# Patient Record
Sex: MALE | Race: WHITE | NOT HISPANIC OR LATINO | Employment: FULL TIME | RURAL
[De-identification: names, ages, dates, MRNs, and addresses within clinical notes are randomized per-mention and may not be internally consistent; named-entity substitution may affect disease eponyms.]

---

## 2021-09-18 ENCOUNTER — INFUSION (OUTPATIENT)
Dept: INFECTIOUS DISEASES | Facility: HOSPITAL | Age: 61
End: 2021-09-18
Attending: NURSE PRACTITIONER
Payer: COMMERCIAL

## 2021-09-18 ENCOUNTER — OFFICE VISIT (OUTPATIENT)
Dept: FAMILY MEDICINE | Facility: CLINIC | Age: 61
End: 2021-09-18
Payer: COMMERCIAL

## 2021-09-18 VITALS
HEART RATE: 92 BPM | WEIGHT: 250 LBS | OXYGEN SATURATION: 98 % | HEIGHT: 76 IN | TEMPERATURE: 98 F | SYSTOLIC BLOOD PRESSURE: 113 MMHG | RESPIRATION RATE: 22 BRPM | DIASTOLIC BLOOD PRESSURE: 89 MMHG | BODY MASS INDEX: 30.44 KG/M2

## 2021-09-18 VITALS — WEIGHT: 250 LBS | TEMPERATURE: 99 F | OXYGEN SATURATION: 96 % | HEART RATE: 97 BPM

## 2021-09-18 DIAGNOSIS — Z20.822 SUSPECTED COVID-19 VIRUS INFECTION: ICD-10-CM

## 2021-09-18 DIAGNOSIS — U07.1 COVID-19: Primary | ICD-10-CM

## 2021-09-18 LAB
CTP QC/QA: YES
FLUAV AG NPH QL: NEGATIVE
FLUBV AG NPH QL: NEGATIVE
SARS-COV-2 AG RESP QL IA.RAPID: POSITIVE

## 2021-09-18 PROCEDURE — 63600175 PHARM REV CODE 636 W HCPCS: Performed by: NURSE PRACTITIONER

## 2021-09-18 PROCEDURE — 99202 PR OFFICE/OUTPT VISIT, NEW, LEVL II, 15-29 MIN: ICD-10-PCS | Mod: ,,, | Performed by: NURSE PRACTITIONER

## 2021-09-18 PROCEDURE — M0243 CASIRIVI AND IMDEVI INFUSION: HCPCS | Performed by: NURSE PRACTITIONER

## 2021-09-18 PROCEDURE — 99051 MED SERV EVE/WKEND/HOLIDAY: CPT | Mod: ,,, | Performed by: NURSE PRACTITIONER

## 2021-09-18 PROCEDURE — 25000003 PHARM REV CODE 250: Performed by: NURSE PRACTITIONER

## 2021-09-18 PROCEDURE — 99051 PR MEDICAL SERVICES, EVE/WKEND/HOLIDAY: ICD-10-PCS | Mod: ,,, | Performed by: NURSE PRACTITIONER

## 2021-09-18 PROCEDURE — 87428 SARSCOV & INF VIR A&B AG IA: CPT | Mod: QW,,, | Performed by: NURSE PRACTITIONER

## 2021-09-18 PROCEDURE — 87428 POCT SARS-COV2 (COVID) WITH FLU ANTIGEN: ICD-10-PCS | Mod: QW,,, | Performed by: NURSE PRACTITIONER

## 2021-09-18 PROCEDURE — 99202 OFFICE O/P NEW SF 15 MIN: CPT | Mod: ,,, | Performed by: NURSE PRACTITIONER

## 2021-09-18 RX ORDER — SODIUM CHLORIDE 9 MG/ML
INJECTION, SOLUTION INTRAVENOUS
Status: DISCONTINUED
Start: 2021-09-18 | End: 2021-09-18 | Stop reason: HOSPADM

## 2021-09-18 RX ORDER — ONDANSETRON 4 MG/1
4 TABLET, ORALLY DISINTEGRATING ORAL ONCE AS NEEDED
Status: DISCONTINUED | OUTPATIENT
Start: 2021-09-18 | End: 2022-05-23

## 2021-09-18 RX ORDER — DIPHENHYDRAMINE HYDROCHLORIDE 50 MG/ML
25 INJECTION INTRAMUSCULAR; INTRAVENOUS ONCE AS NEEDED
Status: DISCONTINUED | OUTPATIENT
Start: 2021-09-18 | End: 2022-05-23

## 2021-09-18 RX ORDER — PREDNISONE 20 MG/1
20 TABLET ORAL DAILY
Qty: 5 TABLET | Refills: 0 | Status: SHIPPED | OUTPATIENT
Start: 2021-09-18 | End: 2022-05-23

## 2021-09-18 RX ORDER — ACETAMINOPHEN 325 MG/1
650 TABLET ORAL ONCE AS NEEDED
Status: DISCONTINUED | OUTPATIENT
Start: 2021-09-18 | End: 2022-05-23

## 2021-09-18 RX ORDER — ALBUTEROL SULFATE 90 UG/1
2 AEROSOL, METERED RESPIRATORY (INHALATION)
Status: DISCONTINUED | OUTPATIENT
Start: 2021-09-18 | End: 2022-05-23

## 2021-09-18 RX ORDER — SODIUM CHLORIDE 0.9 % (FLUSH) 0.9 %
10 SYRINGE (ML) INJECTION
Status: DISCONTINUED | OUTPATIENT
Start: 2021-09-18 | End: 2022-05-23

## 2021-09-18 RX ORDER — BENZONATATE 100 MG/1
100 CAPSULE ORAL 3 TIMES DAILY PRN
Qty: 30 CAPSULE | Refills: 0 | Status: SHIPPED | OUTPATIENT
Start: 2021-09-18 | End: 2022-05-23

## 2021-09-18 RX ORDER — EPINEPHRINE 0.3 MG/.3ML
0.3 INJECTION SUBCUTANEOUS
Status: DISCONTINUED | OUTPATIENT
Start: 2021-09-18 | End: 2022-05-23

## 2021-09-18 RX ORDER — AZITHROMYCIN 250 MG/1
TABLET, FILM COATED ORAL
Qty: 6 TABLET | Refills: 0 | Status: SHIPPED | OUTPATIENT
Start: 2021-09-18 | End: 2022-05-23

## 2021-09-18 RX ADMIN — CASIRIVIMAB AND IMDEVIMAB 600 MG: 600; 600 INJECTION, SOLUTION, CONCENTRATE INTRAVENOUS at 05:09

## 2022-04-27 DIAGNOSIS — Z80.0 FAMILY HISTORY OF COLON CANCER: ICD-10-CM

## 2022-04-27 DIAGNOSIS — Z86.010 HISTORY OF COLON POLYPS: Primary | ICD-10-CM

## 2022-05-23 ENCOUNTER — ANESTHESIA EVENT (OUTPATIENT)
Dept: GASTROENTEROLOGY | Facility: HOSPITAL | Age: 62
End: 2022-05-23
Payer: COMMERCIAL

## 2022-05-23 ENCOUNTER — ANESTHESIA (OUTPATIENT)
Dept: GASTROENTEROLOGY | Facility: HOSPITAL | Age: 62
End: 2022-05-23
Payer: COMMERCIAL

## 2022-05-23 ENCOUNTER — HOSPITAL ENCOUNTER (OUTPATIENT)
Dept: GASTROENTEROLOGY | Facility: HOSPITAL | Age: 62
Discharge: HOME | End: 2022-05-23
Attending: INTERNAL MEDICINE
Payer: COMMERCIAL

## 2022-05-23 VITALS
RESPIRATION RATE: 16 BRPM | BODY MASS INDEX: 30.43 KG/M2 | OXYGEN SATURATION: 98 % | SYSTOLIC BLOOD PRESSURE: 122 MMHG | WEIGHT: 250 LBS | TEMPERATURE: 98 F | HEART RATE: 63 BPM | DIASTOLIC BLOOD PRESSURE: 73 MMHG

## 2022-05-23 DIAGNOSIS — Z12.11 SCREENING FOR COLON CANCER: ICD-10-CM

## 2022-05-23 DIAGNOSIS — Z86.010 HISTORY OF COLON POLYPS: ICD-10-CM

## 2022-05-23 DIAGNOSIS — Z80.0 FAMILY HISTORY OF COLON CANCER: ICD-10-CM

## 2022-05-23 DIAGNOSIS — K57.30 DIVERTICULA, COLON: ICD-10-CM

## 2022-05-23 PROCEDURE — 37000008 HC ANESTHESIA 1ST 15 MINUTES

## 2022-05-23 PROCEDURE — D9220A PRA ANESTHESIA: Mod: ,,, | Performed by: NURSE ANESTHETIST, CERTIFIED REGISTERED

## 2022-05-23 PROCEDURE — 27201423 OPTIME MED/SURG SUP & DEVICES STERILE SUPPLY

## 2022-05-23 PROCEDURE — 37000009 HC ANESTHESIA EA ADD 15 MINS

## 2022-05-23 PROCEDURE — D9220A PRA ANESTHESIA: ICD-10-PCS | Mod: ,,, | Performed by: NURSE ANESTHETIST, CERTIFIED REGISTERED

## 2022-05-23 PROCEDURE — 63600175 PHARM REV CODE 636 W HCPCS: Performed by: NURSE ANESTHETIST, CERTIFIED REGISTERED

## 2022-05-23 PROCEDURE — 25000003 PHARM REV CODE 250: Performed by: NURSE ANESTHETIST, CERTIFIED REGISTERED

## 2022-05-23 PROCEDURE — G0105 COLORECTAL SCRN; HI RISK IND: HCPCS

## 2022-05-23 RX ORDER — LIDOCAINE HYDROCHLORIDE 20 MG/ML
INJECTION, SOLUTION EPIDURAL; INFILTRATION; INTRACAUDAL; PERINEURAL
Status: DISCONTINUED | OUTPATIENT
Start: 2022-05-23 | End: 2022-05-23

## 2022-05-23 RX ORDER — SODIUM CHLORIDE 0.9 % (FLUSH) 0.9 %
10 SYRINGE (ML) INJECTION
Status: DISCONTINUED | OUTPATIENT
Start: 2022-05-23 | End: 2022-05-24 | Stop reason: HOSPADM

## 2022-05-23 RX ORDER — PROPOFOL 10 MG/ML
VIAL (ML) INTRAVENOUS
Status: DISCONTINUED | OUTPATIENT
Start: 2022-05-23 | End: 2022-05-23

## 2022-05-23 RX ADMIN — LIDOCAINE HYDROCHLORIDE 100 MG: 20 INJECTION, SOLUTION INTRAVENOUS at 02:05

## 2022-05-23 RX ADMIN — PROPOFOL 200 MG: 10 INJECTION, EMULSION INTRAVENOUS at 02:05

## 2022-05-23 NOTE — ANESTHESIA PREPROCEDURE EVALUATION
05/23/2022  Adin Bell is a 61 y.o., male.      Pre-op Assessment    I have reviewed the Patient Summary Reports.     I have reviewed the Nursing Notes. I have reviewed the NPO Status.   I have reviewed the Medications.     Review of Systems  Anesthesia Hx:  No problems with previous Anesthesia  Neg history of prior surgery.       Physical Exam  General: Well nourished    Airway:  Mallampati: I   Mouth Opening: Normal  TM Distance: Normal  Tongue: Normal  Neck ROM: Normal ROM        Anesthesia Plan  Type of Anesthesia, risks & benefits discussed:    Anesthesia Type: Gen Natural Airway  Intra-op Monitoring Plan: Standard ASA Monitors  Post Op Pain Control Plan: multimodal analgesia  Induction:  IV  Airway Plan: Direct  Informed Consent: Informed consent signed with the Patient and all parties understand the risks and agree with anesthesia plan.  All questions answered. Patient consented to blood products? Yes  ASA Score: 2  Day of Surgery Review of History & Physical: H&P Update referred to the surgeon/provider.    Ready For Surgery From Anesthesia Perspective.     .

## 2022-05-23 NOTE — H&P
[facility]  Gastroenterology  H&P    Patient Name: Adin Bell  MRN: [id number]  Admission Date: 5/23/2022  Code Status: Full Code    Attending Provider: Adonay Greene MD  Primary Care Physician: MOUNIKA Kothari  Principal Problem:<principal problem not specified>    Subjective:      History of Present Illness: Pt has family history of colon cancer in sister. Year of last colonoscopy is unknown.     Past Medical History:   Diagnosis Date    COVID-19 09/2021       Past Surgical History:   Procedure Laterality Date    herniated disc      TONSILLECTOMY         Review of patient's allergies indicates:  No Known Allergies  Family History     Problem Relation (Age of Onset)    Heart disease Father        Tobacco Use    Smoking status: Never Smoker    Smokeless tobacco: Current User     Types: Snuff   Substance and Sexual Activity    Alcohol use: Never    Drug use: Never    Sexual activity: Yes     Review of Systems   Respiratory: Negative.    Cardiovascular: Negative.    Gastrointestinal: Negative.      Objective:     Vital Signs (Most Recent):  Temp: 98 °F (36.7 °C) (05/23/22 1248)  Pulse: 71 (05/23/22 1248)  Resp: 19 (05/23/22 1248)  BP: 107/69 (05/23/22 1248)  SpO2: 98 % (05/23/22 1248) Vital Signs (24h Range):  Temp:  [98 °F (36.7 °C)] 98 °F (36.7 °C)  Pulse:  [71] 71  Resp:  [19] 19  SpO2:  [98 %] 98 %  BP: (107)/(69) 107/69     Weight: 113.4 kg (250 lb) (05/23/22 1248)  Body mass index is 30.43 kg/m².    No intake or output data in the 24 hours ending 05/23/22 1412    Lines/Drains/Airways     Peripheral Intravenous Line  Duration                Peripheral IV - Single Lumen 05/23/22 1252 Anterior;Distal;Left Antecubital <1 day                Physical Exam  Vitals reviewed.   Constitutional:       General: He is not in acute distress.     Appearance: Normal appearance. He is well-developed. He is not ill-appearing.   HENT:      Head: Normocephalic and atraumatic.      Nose: Nose normal.    Eyes:      Pupils: Pupils are equal, round, and reactive to light.   Cardiovascular:      Rate and Rhythm: Normal rate and regular rhythm.   Pulmonary:      Effort: Pulmonary effort is normal.      Breath sounds: Normal breath sounds. No wheezing.   Abdominal:      General: Abdomen is flat. Bowel sounds are normal. There is no distension.      Palpations: Abdomen is soft.      Tenderness: There is no abdominal tenderness. There is no guarding.   Skin:     General: Skin is warm and dry.      Coloration: Skin is not jaundiced.   Neurological:      Mental Status: He is alert.   Psychiatric:         Attention and Perception: Attention normal.         Mood and Affect: Affect normal.         Speech: Speech normal.         Behavior: Behavior is cooperative.      Comments: Pt was calm while speaking.         Significant Labs:  CBC: No results for input(s): WBC, HGB, HCT, PLT in the last 48 hours.  CMP: No results for input(s): GLU, CALCIUM, ALBUMIN, PROT, NA, K, CO2, CL, BUN, CREATININE, ALKPHOS, ALT, AST, BILITOT in the last 48 hours.    Significant Imaging:  Imaging results within the past 24 hours have been reviewed.    Assessment/Plan:     There are no hospital problems to display for this patient.        Imp: family hx of colon cancer in sister  Plan: colonoscopy    Adonay Greene MD  Gastroenterology  [facility]

## 2022-05-23 NOTE — DISCHARGE INSTRUCTIONS
Procedure Date  5/23/22     Impression  Overall Impression: No polyps were seen. Diverticula were noted in the sigmoid and descending colon.     Recommendation  Repeat colonoscopy in 5 years; high fiber diet.    NO DRIVING, OPERATING EQUIPMENT, OR SIGNING LEGAL DOCUMENTS FOR 24 HOURS.

## 2022-05-23 NOTE — ANESTHESIA POSTPROCEDURE EVALUATION
Anesthesia Post Evaluation    Patient: Adin Bell    Procedure(s) Performed: * No procedures listed *    Final Anesthesia Type: MAC      Patient location during evaluation: PACU  Patient participation: Yes- Able to Participate  Level of consciousness: awake and alert  Post-procedure vital signs: reviewed and stable  Pain management: adequate  Airway patency: patent    PONV status at discharge: No PONV  Anesthetic complications: no      Cardiovascular status: blood pressure returned to baseline  Respiratory status: unassisted and spontaneous ventilation  Hydration status: euvolemic  Follow-up not needed.          Vitals Value Taken Time   /75 05/23/22 1428   Temp 37 05/23/22 1428   Pulse 75 05/23/22 1428   Resp 16 05/23/22 1428   SpO2 100 05/23/22 1428         No case tracking events are documented in the log.      Pain/Flora Score: No data recorded

## 2022-05-23 NOTE — TRANSFER OF CARE
Anesthesia Transfer of Care Note    Patient: Adin Bell    Procedure(s) Performed: * No procedures listed *    Patient location: PACU    Anesthesia Type: general    Transport from OR: Transported from OR on room air with adequate spontaneous ventilation    Post pain: adequate analgesia    Post assessment: no apparent anesthetic complications and tolerated procedure well    Post vital signs: stable    Level of consciousness: awake    Nausea/Vomiting: no nausea    Complications: none    Transfer of care protocol was followed      Last vitals:   Visit Vitals  BP 99/75 (BP Location: Right arm, Patient Position: Lying)   Pulse 71   Temp 36.7 °C (98 °F) (Tympanic)   Resp 16   Wt 113.4 kg (250 lb)   SpO2 100%   BMI 30.43 kg/m²

## 2023-02-09 ENCOUNTER — CLINICAL SUPPORT (OUTPATIENT)
Dept: PRIMARY CARE CLINIC | Facility: CLINIC | Age: 63
End: 2023-02-09

## 2023-02-09 DIAGNOSIS — Z02.4 ENCOUNTER FOR DEPARTMENT OF TRANSPORTATION (DOT) EXAMINATION FOR DRIVING LICENSE RENEWAL: Primary | ICD-10-CM

## 2023-02-09 PROCEDURE — 99499 UNLISTED E&M SERVICE: CPT | Mod: ,,, | Performed by: NURSE PRACTITIONER

## 2023-02-09 PROCEDURE — 99499 PR PHYSICAL - DOT/CDL: ICD-10-PCS | Mod: ,,, | Performed by: NURSE PRACTITIONER

## 2023-02-09 NOTE — PROGRESS NOTES
Subjective:       Patient ID: Adin Bell is a 62 y.o. male.    Chief Complaint: No chief complaint on file.    HPI  Review of Systems      Objective:      Physical Exam    Assessment:       Problem List Items Addressed This Visit    None  Visit Diagnoses       Encounter for Department of Transportation (DOT) examination for driving license renewal [Z02.4 (ICD-10-CM)]    -  Primary            Plan:       See scanned documents in .         .

## 2024-05-06 ENCOUNTER — OFFICE VISIT (OUTPATIENT)
Dept: FAMILY MEDICINE | Facility: CLINIC | Age: 64
End: 2024-05-06
Payer: COMMERCIAL

## 2024-05-06 VITALS
SYSTOLIC BLOOD PRESSURE: 116 MMHG | WEIGHT: 261.63 LBS | DIASTOLIC BLOOD PRESSURE: 81 MMHG | OXYGEN SATURATION: 97 % | HEART RATE: 68 BPM | RESPIRATION RATE: 18 BRPM | TEMPERATURE: 98 F | HEIGHT: 76 IN | BODY MASS INDEX: 31.86 KG/M2

## 2024-05-06 DIAGNOSIS — Z12.5 SCREENING PSA (PROSTATE SPECIFIC ANTIGEN): ICD-10-CM

## 2024-05-06 DIAGNOSIS — R42 VERTIGO: Primary | ICD-10-CM

## 2024-05-06 LAB
ALBUMIN SERPL BCP-MCNC: 3.9 G/DL (ref 3.5–5)
ALBUMIN/GLOB SERPL: 1.1 {RATIO}
ALP SERPL-CCNC: 107 U/L (ref 45–115)
ALT SERPL W P-5'-P-CCNC: 31 U/L (ref 16–61)
ANION GAP SERPL CALCULATED.3IONS-SCNC: 8 MMOL/L (ref 7–16)
AST SERPL W P-5'-P-CCNC: 28 U/L (ref 15–37)
BASOPHILS # BLD AUTO: 0.04 K/UL (ref 0–0.2)
BASOPHILS NFR BLD AUTO: 0.6 % (ref 0–1)
BILIRUB SERPL-MCNC: 1.6 MG/DL (ref ?–1.2)
BUN SERPL-MCNC: 11 MG/DL (ref 7–18)
BUN/CREAT SERPL: 13 (ref 6–20)
CALCIUM SERPL-MCNC: 9 MG/DL (ref 8.5–10.1)
CHLORIDE SERPL-SCNC: 106 MMOL/L (ref 98–107)
CO2 SERPL-SCNC: 28 MMOL/L (ref 21–32)
CREAT SERPL-MCNC: 0.88 MG/DL (ref 0.7–1.3)
DIFFERENTIAL METHOD BLD: ABNORMAL
EGFR (NO RACE VARIABLE) (RUSH/TITUS): 98 ML/MIN/1.73M2
EOSINOPHIL # BLD AUTO: 0.12 K/UL (ref 0–0.5)
EOSINOPHIL NFR BLD AUTO: 1.7 % (ref 1–4)
ERYTHROCYTE [DISTWIDTH] IN BLOOD BY AUTOMATED COUNT: 13.2 % (ref 11.5–14.5)
GLOBULIN SER-MCNC: 3.5 G/DL (ref 2–4)
GLUCOSE SERPL-MCNC: 96 MG/DL (ref 74–106)
HCT VFR BLD AUTO: 48.5 % (ref 40–54)
HGB BLD-MCNC: 16.2 G/DL (ref 13.5–18)
IMM GRANULOCYTES # BLD AUTO: 0.02 K/UL (ref 0–0.04)
IMM GRANULOCYTES NFR BLD: 0.3 % (ref 0–0.4)
LYMPHOCYTES # BLD AUTO: 2.07 K/UL (ref 1–4.8)
LYMPHOCYTES NFR BLD AUTO: 29.2 % (ref 27–41)
MCH RBC QN AUTO: 29.9 PG (ref 27–31)
MCHC RBC AUTO-ENTMCNC: 33.4 G/DL (ref 32–36)
MCV RBC AUTO: 89.5 FL (ref 80–96)
MONOCYTES # BLD AUTO: 0.49 K/UL (ref 0–0.8)
MONOCYTES NFR BLD AUTO: 6.9 % (ref 2–6)
MPC BLD CALC-MCNC: 10.2 FL (ref 9.4–12.4)
NEUTROPHILS # BLD AUTO: 4.34 K/UL (ref 1.8–7.7)
NEUTROPHILS NFR BLD AUTO: 61.3 % (ref 53–65)
NRBC # BLD AUTO: 0 X10E3/UL
NRBC, AUTO (.00): 0 %
PLATELET # BLD AUTO: 241 K/UL (ref 150–400)
POTASSIUM SERPL-SCNC: 4 MMOL/L (ref 3.5–5.1)
PROT SERPL-MCNC: 7.4 G/DL (ref 6.4–8.2)
PSA SERPL-MCNC: 0.53 NG/ML
RBC # BLD AUTO: 5.42 M/UL (ref 4.6–6.2)
SODIUM SERPL-SCNC: 138 MMOL/L (ref 136–145)
WBC # BLD AUTO: 7.08 K/UL (ref 4.5–11)

## 2024-05-06 PROCEDURE — 3008F BODY MASS INDEX DOCD: CPT | Mod: CPTII,,, | Performed by: NURSE PRACTITIONER

## 2024-05-06 PROCEDURE — 3079F DIAST BP 80-89 MM HG: CPT | Mod: CPTII,,, | Performed by: NURSE PRACTITIONER

## 2024-05-06 PROCEDURE — 1160F RVW MEDS BY RX/DR IN RCRD: CPT | Mod: CPTII,,, | Performed by: NURSE PRACTITIONER

## 2024-05-06 PROCEDURE — 80053 COMPREHEN METABOLIC PANEL: CPT | Mod: ,,, | Performed by: CLINICAL MEDICAL LABORATORY

## 2024-05-06 PROCEDURE — 3074F SYST BP LT 130 MM HG: CPT | Mod: CPTII,,, | Performed by: NURSE PRACTITIONER

## 2024-05-06 PROCEDURE — 99213 OFFICE O/P EST LOW 20 MIN: CPT | Mod: ,,, | Performed by: NURSE PRACTITIONER

## 2024-05-06 PROCEDURE — 85025 COMPLETE CBC W/AUTO DIFF WBC: CPT | Mod: ,,, | Performed by: CLINICAL MEDICAL LABORATORY

## 2024-05-06 PROCEDURE — 1159F MED LIST DOCD IN RCRD: CPT | Mod: CPTII,,, | Performed by: NURSE PRACTITIONER

## 2024-05-06 PROCEDURE — G0103 PSA SCREENING: HCPCS | Mod: ,,, | Performed by: CLINICAL MEDICAL LABORATORY

## 2024-05-06 RX ORDER — MECLIZINE HYDROCHLORIDE 25 MG/1
25 TABLET ORAL 3 TIMES DAILY PRN
Qty: 30 TABLET | Refills: 0 | Status: SHIPPED | OUTPATIENT
Start: 2024-05-06

## 2024-05-06 NOTE — Clinical Note
Will you check on his colonscopy care gap. Had c-scope 2022. Says to repeat in 5 years. Care gaps is saying q6months?

## 2024-05-06 NOTE — PROGRESS NOTES
Clinic Note    Adin Bell is a 63 y.o. male     Chief Complaint:   Chief Complaint   Patient presents with    Dizziness     States been getting dizzy/vertigo a lot more recently    Follow-up     Requesting PSA lab    Health Maintenance     Hepatitis C Screening Never done  HIV Screening Never done  TETANUS VACCINE Never done  Hemoglobin A1c (Diabetic Prevention Screening) Never done  Shingles Vaccine(1 of 2) Never done  RSV Vaccine (Age 60+ and Pregnant patients)(1 - 1-dose 60+ series) Never done  Colorectal Cancer Screening due on 11/23/2022  COVID-19 Vaccine(1 - 2023-24 season) Never done          Subjective:    Patient complains of dizziness off and on. Symptoms X 6 months. Does not occur daily. Occurs with position changes. For example, patient states he slid under his truck the other day to change tire and got very dizzy. Admits to drinking water but also loves mountain dew. Denies ear pain or drainage.   Denies pertinent pmh. Denies routine medication use.   Patient request psa screening. Admits to positive family hx of prostate cancer.          Allergies:   Review of patient's allergies indicates:  No Known Allergies     Past Medical History:  Past Medical History:   Diagnosis Date    COVID-19 09/2021    Diverticula, colon 5/23/2022    Family history of colon cancer 5/23/2022    Screening for colon cancer 5/23/2022        Current Medications:    Current Outpatient Medications:     meclizine (ANTIVERT) 25 mg tablet, Take 1 tablet (25 mg total) by mouth 3 (three) times daily as needed for Dizziness., Disp: 30 tablet, Rfl: 0       Review of Systems   Constitutional:  Negative for fever.   HENT:  Negative for ear discharge and ear pain.    Respiratory:  Negative for cough and shortness of breath.    Cardiovascular:  Negative for chest pain and palpitations.   Gastrointestinal:  Negative for abdominal pain.   Neurological:  Positive for dizziness. Negative for weakness and headaches.          Objective:    BP  "116/81 (BP Location: Left arm, Patient Position: Standing, BP Method: Medium (Automatic)) Comment (Patient Position): post 3 min stading.  Pulse 68   Temp 98 °F (36.7 °C) (Oral)   Resp 18   Ht 6' 4" (1.93 m)   Wt 118.7 kg (261 lb 9.6 oz)   SpO2 97%   BMI 31.84 kg/m²      Physical Exam  Constitutional:       Appearance: Normal appearance.   HENT:      Right Ear: Tympanic membrane normal.      Left Ear: Tympanic membrane normal.   Eyes:      Extraocular Movements: Extraocular movements intact.   Cardiovascular:      Rate and Rhythm: Normal rate and regular rhythm.      Pulses: Normal pulses.      Heart sounds: Normal heart sounds.   Pulmonary:      Effort: Pulmonary effort is normal.      Breath sounds: Normal breath sounds.   Neurological:      Mental Status: He is alert and oriented to person, place, and time.   Psychiatric:         Mood and Affect: Mood normal.          Assessment and Plan:    1. Vertigo    2. Screening PSA (prostate specific antigen)         Vertigo  -     Comprehensive Metabolic Panel; Future; Expected date: 05/06/2024  -     CBC Auto Differential; Future; Expected date: 05/06/2024  -     meclizine (ANTIVERT) 25 mg tablet; Take 1 tablet (25 mg total) by mouth 3 (three) times daily as needed for Dizziness.  Dispense: 30 tablet; Refill: 0  -change positions slowly  -stay hydrated    Screening PSA (prostate specific antigen)  -     PSA, Screening; Future; Expected date: 05/06/2024          There are no Patient Instructions on file for this visit.   Follow up if symptoms worsen or fail to improve.     "

## 2024-05-08 ENCOUNTER — TELEPHONE (OUTPATIENT)
Dept: FAMILY MEDICINE | Facility: CLINIC | Age: 64
End: 2024-05-08
Payer: COMMERCIAL